# Patient Record
Sex: FEMALE | Race: BLACK OR AFRICAN AMERICAN | NOT HISPANIC OR LATINO | URBAN - METROPOLITAN AREA
[De-identification: names, ages, dates, MRNs, and addresses within clinical notes are randomized per-mention and may not be internally consistent; named-entity substitution may affect disease eponyms.]

---

## 2023-09-21 ENCOUNTER — LAB (OUTPATIENT)
Dept: LAB | Facility: LAB | Age: 38
End: 2023-09-21
Payer: COMMERCIAL

## 2023-09-21 LAB
HEPATITIS B VIRUS SURFACE AG PRESENCE IN SERUM: NONREACTIVE
HEPATITIS C VIRUS AB PRESENCE IN SERUM: NONREACTIVE
HIV 1/ 2 AG/AB SCREEN: NONREACTIVE
SYPHILIS TOTAL AB: NONREACTIVE

## 2023-09-22 LAB
CHLAMYDIA TRACH., AMPLIFIED: NEGATIVE
N. GONORRHEA, AMPLIFIED: NEGATIVE
TRICHOMONAS VAGINALIS: NEGATIVE
URINE CULTURE: NORMAL

## 2023-09-23 LAB
CLUE CELLS: ABNORMAL
NUGENT SCORE: 6
VAGINITIS-BV + YEAST INTERPRETATION: ABNORMAL
YEAST: ABNORMAL

## 2024-01-04 ENCOUNTER — OFFICE VISIT (OUTPATIENT)
Dept: PRIMARY CARE | Facility: HOSPITAL | Age: 39
End: 2024-01-04
Payer: COMMERCIAL

## 2024-01-04 VITALS
OXYGEN SATURATION: 100 % | HEART RATE: 101 BPM | BODY MASS INDEX: 50.02 KG/M2 | WEIGHT: 293 LBS | TEMPERATURE: 95.6 F | HEIGHT: 64 IN | SYSTOLIC BLOOD PRESSURE: 173 MMHG | DIASTOLIC BLOOD PRESSURE: 130 MMHG

## 2024-01-04 DIAGNOSIS — R35.0 URINARY FREQUENCY: Primary | ICD-10-CM

## 2024-01-04 DIAGNOSIS — I10 PRIMARY HYPERTENSION: ICD-10-CM

## 2024-01-04 DIAGNOSIS — R10.84 GENERALIZED ABDOMINAL PAIN: ICD-10-CM

## 2024-01-04 DIAGNOSIS — A64 STI (SEXUALLY TRANSMITTED INFECTION): ICD-10-CM

## 2024-01-04 DIAGNOSIS — K64.4 EXTERNAL HEMORRHOIDS: ICD-10-CM

## 2024-01-04 DIAGNOSIS — R73.09 OTHER ABNORMAL GLUCOSE: ICD-10-CM

## 2024-01-04 DIAGNOSIS — E66.01 CLASS 3 SEVERE OBESITY DUE TO EXCESS CALORIES WITH SERIOUS COMORBIDITY AND BODY MASS INDEX (BMI) OF 50.0 TO 59.9 IN ADULT (MULTI): ICD-10-CM

## 2024-01-04 DIAGNOSIS — R30.0 DYSURIA: ICD-10-CM

## 2024-01-04 LAB
ALBUMIN SERPL BCP-MCNC: 4.3 G/DL (ref 3.4–5)
ALP SERPL-CCNC: 75 U/L (ref 33–110)
ALT SERPL W P-5'-P-CCNC: 13 U/L (ref 7–45)
ANION GAP SERPL CALC-SCNC: 13 MMOL/L (ref 10–20)
APPEARANCE UR: CLEAR
AST SERPL W P-5'-P-CCNC: 24 U/L (ref 9–39)
BASOPHILS # BLD AUTO: 0.02 X10*3/UL (ref 0–0.1)
BASOPHILS NFR BLD AUTO: 0.7 %
BILIRUB SERPL-MCNC: 0.7 MG/DL (ref 0–1.2)
BILIRUB UR STRIP.AUTO-MCNC: NEGATIVE MG/DL
BUN SERPL-MCNC: 7 MG/DL (ref 6–23)
CALCIUM SERPL-MCNC: 9.3 MG/DL (ref 8.6–10.6)
CHLORIDE SERPL-SCNC: 99 MMOL/L (ref 98–107)
CHOLEST SERPL-MCNC: 245 MG/DL (ref 0–199)
CHOLESTEROL/HDL RATIO: 3.3
CO2 SERPL-SCNC: 27 MMOL/L (ref 21–32)
COLOR UR: YELLOW
CREAT SERPL-MCNC: 0.73 MG/DL (ref 0.5–1.05)
EOSINOPHIL # BLD AUTO: 0.02 X10*3/UL (ref 0–0.7)
EOSINOPHIL NFR BLD AUTO: 0.7 %
ERYTHROCYTE [DISTWIDTH] IN BLOOD BY AUTOMATED COUNT: 11.8 % (ref 11.5–14.5)
EST. AVERAGE GLUCOSE BLD GHB EST-MCNC: 85 MG/DL
GFR SERPL CREATININE-BSD FRML MDRD: >90 ML/MIN/1.73M*2
GLUCOSE SERPL-MCNC: 95 MG/DL (ref 74–99)
GLUCOSE UR STRIP.AUTO-MCNC: NEGATIVE MG/DL
HAV IGM SER QL: NONREACTIVE
HBA1C MFR BLD: 4.6 %
HBV CORE IGM SER QL: NONREACTIVE
HBV SURFACE AG SERPL QL IA: NONREACTIVE
HCT VFR BLD AUTO: 38.5 % (ref 36–46)
HCV AB SER QL: NONREACTIVE
HDLC SERPL-MCNC: 74.8 MG/DL
HGB BLD-MCNC: 13.7 G/DL (ref 12–16)
HIV 1+2 AB+HIV1 P24 AG SERPL QL IA: NONREACTIVE
IMM GRANULOCYTES # BLD AUTO: 0.01 X10*3/UL (ref 0–0.7)
IMM GRANULOCYTES NFR BLD AUTO: 0.4 % (ref 0–0.9)
KETONES UR STRIP.AUTO-MCNC: NEGATIVE MG/DL
LDLC SERPL CALC-MCNC: 143 MG/DL
LEUKOCYTE ESTERASE UR QL STRIP.AUTO: NEGATIVE
LIPASE SERPL-CCNC: 32 U/L (ref 9–82)
LYMPHOCYTES # BLD AUTO: 1.76 X10*3/UL (ref 1.2–4.8)
LYMPHOCYTES NFR BLD AUTO: 62.6 %
MCH RBC QN AUTO: 33 PG (ref 26–34)
MCHC RBC AUTO-ENTMCNC: 35.6 G/DL (ref 32–36)
MCV RBC AUTO: 93 FL (ref 80–100)
MONOCYTES # BLD AUTO: 0.27 X10*3/UL (ref 0.1–1)
MONOCYTES NFR BLD AUTO: 9.6 %
NEUTROPHILS # BLD AUTO: 0.73 X10*3/UL (ref 1.2–7.7)
NEUTROPHILS NFR BLD AUTO: 26 %
NITRITE UR QL STRIP.AUTO: NEGATIVE
NON HDL CHOLESTEROL: 170 MG/DL (ref 0–149)
NRBC BLD-RTO: 0 /100 WBCS (ref 0–0)
PH UR STRIP.AUTO: 6 [PH]
PLATELET # BLD AUTO: 196 X10*3/UL (ref 150–450)
POTASSIUM SERPL-SCNC: 4.1 MMOL/L (ref 3.5–5.3)
PROT SERPL-MCNC: 7.2 G/DL (ref 6.4–8.2)
PROT UR STRIP.AUTO-MCNC: NEGATIVE MG/DL
RBC # BLD AUTO: 4.15 X10*6/UL (ref 4–5.2)
RBC # UR STRIP.AUTO: NEGATIVE /UL
SODIUM SERPL-SCNC: 135 MMOL/L (ref 136–145)
SP GR UR STRIP.AUTO: 1.01
TRIGL SERPL-MCNC: 136 MG/DL (ref 0–149)
UROBILINOGEN UR STRIP.AUTO-MCNC: 2 MG/DL
VLDL: 27 MG/DL (ref 0–40)
WBC # BLD AUTO: 2.8 X10*3/UL (ref 4.4–11.3)

## 2024-01-04 PROCEDURE — 80053 COMPREHEN METABOLIC PANEL: CPT

## 2024-01-04 PROCEDURE — 36415 COLL VENOUS BLD VENIPUNCTURE: CPT | Mod: GC

## 2024-01-04 PROCEDURE — 99215 OFFICE O/P EST HI 40 MIN: CPT | Mod: GE

## 2024-01-04 PROCEDURE — 3077F SYST BP >= 140 MM HG: CPT

## 2024-01-04 PROCEDURE — 86705 HEP B CORE ANTIBODY IGM: CPT

## 2024-01-04 PROCEDURE — 83690 ASSAY OF LIPASE: CPT

## 2024-01-04 PROCEDURE — 85025 COMPLETE CBC W/AUTO DIFF WBC: CPT

## 2024-01-04 PROCEDURE — 87389 HIV-1 AG W/HIV-1&-2 AB AG IA: CPT

## 2024-01-04 PROCEDURE — 3008F BODY MASS INDEX DOCD: CPT

## 2024-01-04 PROCEDURE — 99205 OFFICE O/P NEW HI 60 MIN: CPT

## 2024-01-04 PROCEDURE — 87800 DETECT AGNT MULT DNA DIREC: CPT

## 2024-01-04 PROCEDURE — 83036 HEMOGLOBIN GLYCOSYLATED A1C: CPT

## 2024-01-04 PROCEDURE — 3080F DIAST BP >= 90 MM HG: CPT

## 2024-01-04 PROCEDURE — 36415 COLL VENOUS BLD VENIPUNCTURE: CPT

## 2024-01-04 PROCEDURE — 80061 LIPID PANEL: CPT

## 2024-01-04 PROCEDURE — 81003 URINALYSIS AUTO W/O SCOPE: CPT

## 2024-01-04 RX ORDER — AMLODIPINE BESYLATE 5 MG/1
5 TABLET ORAL DAILY
Qty: 90 TABLET | Refills: 0 | Status: SHIPPED | OUTPATIENT
Start: 2024-01-04 | End: 2024-01-18 | Stop reason: DRUGHIGH

## 2024-01-04 RX ORDER — LOSARTAN POTASSIUM 50 MG/1
50 TABLET ORAL DAILY
Qty: 30 TABLET | Refills: 2 | Status: SHIPPED | OUTPATIENT
Start: 2024-01-04 | End: 2024-04-25 | Stop reason: SDUPTHER

## 2024-01-04 RX ORDER — NEBULIZER AND COMPRESSOR
1 EACH MISCELLANEOUS DAILY
Qty: 1 EACH | Refills: 0 | Status: SHIPPED | OUTPATIENT
Start: 2024-01-04

## 2024-01-04 RX ORDER — AMLODIPINE BESYLATE 5 MG/1
5 TABLET ORAL DAILY
COMMUNITY
End: 2024-01-04 | Stop reason: SDUPTHER

## 2024-01-04 ASSESSMENT — ENCOUNTER SYMPTOMS
DEPRESSION: 1
DYSURIA: 0
APPETITE CHANGE: 0
NAUSEA: 0
FEVER: 0
COLOR CHANGE: 0
FLANK PAIN: 0
CHILLS: 0
ACTIVITY CHANGE: 0
WEAKNESS: 0
SHORTNESS OF BREATH: 0
VOMITING: 0
ABDOMINAL PAIN: 1
HEMATOLOGIC/LYMPHATIC NEGATIVE: 1
WHEEZING: 0
ALLERGIC/IMMUNOLOGIC NEGATIVE: 1
LOSS OF SENSATION IN FEET: 0
ENDOCRINE NEGATIVE: 1
JOINT SWELLING: 0
FREQUENCY: 1
MYALGIAS: 0
PALPITATIONS: 0
PHOTOPHOBIA: 0
LIGHT-HEADEDNESS: 0
BACK PAIN: 0
DIZZINESS: 0
OCCASIONAL FEELINGS OF UNSTEADINESS: 0
NUMBNESS: 0
DIARRHEA: 0
SORE THROAT: 0
FATIGUE: 0
HEADACHES: 0
PSYCHIATRIC NEGATIVE: 1
COUGH: 0

## 2024-01-04 ASSESSMENT — PATIENT HEALTH QUESTIONNAIRE - PHQ9
10. IF YOU CHECKED OFF ANY PROBLEMS, HOW DIFFICULT HAVE THESE PROBLEMS MADE IT FOR YOU TO DO YOUR WORK, TAKE CARE OF THINGS AT HOME, OR GET ALONG WITH OTHER PEOPLE: SOMEWHAT DIFFICULT
1. LITTLE INTEREST OR PLEASURE IN DOING THINGS: SEVERAL DAYS
2. FEELING DOWN, DEPRESSED OR HOPELESS: SEVERAL DAYS
SUM OF ALL RESPONSES TO PHQ9 QUESTIONS 1 AND 2: 2

## 2024-01-04 ASSESSMENT — PAIN SCALES - GENERAL: PAINLEVEL: 2

## 2024-01-04 NOTE — PATIENT INSTRUCTIONS
As discussed today, our plan is:     Labs - I will call you with the results.   Medication changes - please continue to take the amlodipine every day. We also started you on another blood pressure medication called losartan. Please take this every day. Please check your blood pressure and record the numbers every day.   I've ordered an ultrasound of your abdomen to help evaluate your abdominal pain.   We checked you for STIs today - I will call you with the results.     Please come back to see me in: 6 weeks  ------  If you have any problems or questions, please contact the clinic at [phone] to leave a message. If your issue cannot wait until the next business day, please go to urgent care or the emergency department.     I also strongly urge all of my patients to register for [facility] by going to: [email or web address]  (The  staff can also send you a text/email link to register when you check out).    No shows: It is understandable if you are unable to make it to a visit, but please cancel your appointment instead of not showing up. This helps to give other patients access to primary care and keeps wait times low.      Campbell Spring MD

## 2024-01-04 NOTE — PROGRESS NOTES
Subjective   Chief complaint: establish care    HPI:  Frankeya Edwards is a 38 y.o. female with past medical history of hypertension, bipolar disorder, and schizophrenia presenting today to [facility].  Patient receives her mental health care at [facility].    Patient complaining of right shoulder pain that radiates to her right upper quadrant that has been going on for the past 3 months.  It has not become any better or worse in this time period.  There is no association with food.  Lying on her left side relieves the pain, and lying on her right side exacerbates the pain.  She does notice some slight relief when taking ibuprofen, but she does this very rarely.  She does endorse loose stools which has been going on for several years.  Patient also endorsing some urinary frequency but denies urgency, incontinence, dysuria, or malodorous urine.  She denies fever, chills, chest pain, dyspnea, N/V.    She was prescribed amlodipine 5 mg from the emergency department several months ago but states that she only takes it on days where she has a headache or feels that her blood pressure is high.  She does not have a blood pressure cuff or monitor blood pressures at home.      Health maintenance:  Health Maintenance   Topic Date Due    Influenza Vaccine (1) 06/30/2024 (Originally 9/1/2023)       Medications:    Current Outpatient Medications:     amLODIPine (Norvasc) 5 mg tablet, Take 1 tablet (5 mg) by mouth once daily., Disp: 90 tablet, Rfl: 0    losartan (Cozaar) 50 mg tablet, Take 1 tablet (50 mg) by mouth once daily., Disp: 30 tablet, Rfl: 2    miscellaneous medical supply (Blood Pressure Cuff) misc, 1 Units once daily., Disp: 1 each, Rfl: 0    tissue resp fact-shark luis oil (Preparation H) rectal ointment, Insert into the rectum 2 times a day as needed for hemorrhoids for up to 7 days., Disp: 30 g, Rfl: 0    Allergies:  No Known Allergies    Past medical history:  Past Medical History:   Diagnosis Date     Bipolar 1 disorder (CMS/HCC)     Candidiasis, unspecified     Yeast infection    Chlamydial infection, unspecified     Chlamydia    Depression     Encounter for screening for malignant neoplasm of vagina     Vaginal Pap smear    Hypertension     Other conditions influencing health status     H/O pregnancy    Other specified health status     Last menstrual period (LMP) > 10 days ago    Personal history of other complications of pregnancy, childbirth and the puerperium     History of ectopic pregnancy    Personal history of other diseases of the circulatory system     History of hypertension    Personal history of other endocrine, nutritional and metabolic disease     History of obesity    Personal history of other mental and behavioral disorders     History of bipolar disorder    Personal history of other mental and behavioral disorders     Personal history of schizophrenia    Personal history of other specified conditions     History of abnormal Pap smear    Schizophrenia (CMS/HCC)     Type B blood, Rh positive     Blood type B+       Surgical history:  Past Surgical History:   Procedure Laterality Date     SECTION, CLASSIC  2014     Section    COLPOSCOPY  2014    Colposcopy    TUBAL LIGATION  2016    Tubal Ligation       Family history:  Family History   Problem Relation Name Age of Onset    Hypertension Mother         Social history:   reports that she has been smoking cigarettes and cigars. She started smoking about 21 years ago. She has never used smokeless tobacco. She reports current alcohol use of about 100.0 standard drinks of alcohol per week. She reports that she does not use drugs.    Review of systems:  Review of Systems   Constitutional:  Negative for activity change, appetite change, chills, fatigue and fever.   HENT:  Negative for congestion, ear pain, sneezing and sore throat.    Eyes:  Negative for photophobia and visual disturbance.   Respiratory:  Negative for  cough, shortness of breath and wheezing.    Cardiovascular:  Negative for chest pain and palpitations.   Gastrointestinal:  Positive for abdominal pain. Negative for diarrhea, nausea and vomiting.   Endocrine: Negative.    Genitourinary:  Positive for frequency. Negative for dysuria, flank pain and urgency.   Musculoskeletal:  Negative for back pain, joint swelling and myalgias.   Skin:  Negative for color change and rash.   Allergic/Immunologic: Negative.    Neurological:  Negative for dizziness, weakness, light-headedness, numbness and headaches.   Hematological: Negative.    Psychiatric/Behavioral: Negative.            Objective     Vitals:  Vitals:    01/04/24 1505   BP: (!) 173/130   Pulse: 101   Temp: 35.3 °C (95.6 °F)   SpO2: 100%       Physical exam:  Physical Exam  Constitutional:       General: She is not in acute distress.     Appearance: She is not toxic-appearing.   HENT:      Head: Normocephalic and atraumatic.      Mouth/Throat:      Mouth: Mucous membranes are moist.      Pharynx: Oropharynx is clear. No oropharyngeal exudate or posterior oropharyngeal erythema.   Eyes:      General: No scleral icterus.     Conjunctiva/sclera: Conjunctivae normal.      Pupils: Pupils are equal, round, and reactive to light.   Cardiovascular:      Rate and Rhythm: Normal rate and regular rhythm.      Heart sounds: No murmur heard.     No gallop.   Pulmonary:      Effort: Pulmonary effort is normal. No respiratory distress.      Breath sounds: Normal breath sounds. No wheezing or rales.   Abdominal:      General: There is no distension.      Tenderness: There is abdominal tenderness in the right upper quadrant. There is no right CVA tenderness, left CVA tenderness, guarding or rebound. Negative signs include Ferguson's sign.   Musculoskeletal:         General: No swelling.      Cervical back: Normal range of motion and neck supple.      Right lower leg: No edema.      Left lower leg: No edema.   Lymphadenopathy:       Cervical: No cervical adenopathy.   Skin:     General: Skin is warm and dry.   Neurological:      General: No focal deficit present.      Mental Status: She is oriented to person, place, and time.   Psychiatric:         Mood and Affect: Mood normal.         Behavior: Behavior normal.         Thought Content: Thought content normal.         Labs:  No results found for this or any previous visit (from the past 24 hour(s)).    Imaging:  No results found.       Assessment/Plan     Assessment and plan:  Frankeya Edwards is a 38 y.o. female with past medical history of hypertension, bipolar disorder, and schizophrenia presenting today to [facility] care.  Patient receives her mental health care at [facility].      #abdominal pain   - abdominal US   - CMP, lipase, lipid panel, CBC       #urinary frequency   - UA with reflex ordered  - STI testing ordered      #HTN  - BP in office 173/130  - takes amlodipine 5mg intermittently   - refilled amlodipine 5mg, educated pt on med compliance  -  started losartan 50mg daily   - BP cuff ordered, instructed pt to check BP daily and record it to bring to next visit   - nurse visit in 2 weeks to check BP      #bipolar disorder   #schizophrenia   - follows at [facility]   - pt unsure which meds she is on   - pt asked to bring meds to next appt    #polysusbtance use disorder   - alcohol and tobacco   - pt reports drinking 4-5 fifths of alcohol per week   - interested in rehab - will pursue at [facility] per pt        #Health Maintenance   Screening:  Lung Cancer: not indicated   Colon Cancer: not indicated   Breast Cancer: not indicated   Cervical Cancer: last PAP 9/2023    Labs:  Lipid Panel: ordered today  HbA1c: ordered today    Infectious Disease   HIV: ordered today  Hep C: ordered today  STIs: ordered today    Vaccinations:  Influenza: refused        Follow-up in 6 weeks.    Patient and plan discussed with attending physician Dr. Quijano.    Campbell Spring MD  Internal  Medicine, PGY-2  [facility]

## 2024-01-05 LAB
C TRACH RRNA SPEC QL NAA+PROBE: NEGATIVE
N GONORRHOEA DNA SPEC QL PROBE+SIG AMP: NEGATIVE

## 2024-01-05 NOTE — PROGRESS NOTES
I discussed this patient with the resident, and I agree with the resident/fellow's medical decision making as documented in the note.    Mariano Quijano MD

## 2024-01-08 ENCOUNTER — TELEPHONE (OUTPATIENT)
Dept: PRIMARY CARE | Facility: HOSPITAL | Age: 39
End: 2024-01-08
Payer: COMMERCIAL

## 2024-01-08 NOTE — TELEPHONE ENCOUNTER
Result Communication    Called patient to review lab work.  Discussed leukopenia, specifically the neutropenia.  Suspect this is a result of her chronic alcohol use vs less likely medication induced (pt on aripiprazole and paroxetine which she has been on for many years per pt report).  Advised patient the need to decrease alcohol consumption.  Will repeat CBC in 6 months to ensure leukopenia has not become worse.  Also discussed elevated cholesterol.  Patient advised on lifestyle modifications that can be beneficial in reducing cholesterol.  Patient expressed understanding.  Patient has upcoming radiology appointment to have abdominal ultrasound completed on 1/12/2024 and a nurse visit on 1/18/2024 to check blood pressure.  Will see patient back in clinic in about 6 weeks.  Patient expressed understanding and all questions were answered.      Resulted Orders   Urinalysis with Reflex Microscopic   Result Value Ref Range    Color, Urine Yellow Straw, Yellow    Appearance, Urine Clear Clear    Specific Gravity, Urine 1.013 1.005 - 1.035    pH, Urine 6.0 5.0, 5.5, 6.0, 6.5, 7.0, 7.5, 8.0    Protein, Urine NEGATIVE NEGATIVE mg/dL    Glucose, Urine NEGATIVE NEGATIVE mg/dL    Blood, Urine NEGATIVE NEGATIVE    Ketones, Urine NEGATIVE NEGATIVE mg/dL    Bilirubin, Urine NEGATIVE NEGATIVE    Urobilinogen, Urine 2.0 (N) <2.0 mg/dL      Comment:      Due to a manufacturing issue, low positive urobilinogen results may be falsely positive. Correlate with urine bilirubin and additional clinical/laboratory findings to assess the risk of hemolytic anemia or liver disease. If clinically indicated, repeat testing with an alternate method is available by contacting the laboratory within 24 hours.    Some pigments and medications may cause a false positive urobilinogen.    Nitrite, Urine NEGATIVE NEGATIVE    Leukocyte Esterase, Urine NEGATIVE NEGATIVE   CBC and Auto Differential   Result Value Ref Range    WBC 2.8 (L) 4.4 - 11.3  x10*3/uL    nRBC 0.0 0.0 - 0.0 /100 WBCs    RBC 4.15 4.00 - 5.20 x10*6/uL    Hemoglobin 13.7 12.0 - 16.0 g/dL    Hematocrit 38.5 36.0 - 46.0 %    MCV 93 80 - 100 fL    MCH 33.0 26.0 - 34.0 pg    MCHC 35.6 32.0 - 36.0 g/dL    RDW 11.8 11.5 - 14.5 %    Platelets 196 150 - 450 x10*3/uL    Neutrophils % 26.0 40.0 - 80.0 %    Immature Granulocytes %, Automated 0.4 0.0 - 0.9 %      Comment:      Immature Granulocyte Count (IG) includes promyelocytes, myelocytes and metamyelocytes but does not include bands. Percent differential counts (%) should be interpreted in the context of the absolute cell counts (cells/UL).    Lymphocytes % 62.6 13.0 - 44.0 %    Monocytes % 9.6 2.0 - 10.0 %    Eosinophils % 0.7 0.0 - 6.0 %    Basophils % 0.7 0.0 - 2.0 %    Neutrophils Absolute 0.73 (L) 1.20 - 7.70 x10*3/uL      Comment:      Percent differential counts (%) should be interpreted in the context of the absolute cell counts (cells/uL).    Immature Granulocytes Absolute, Automated 0.01 0.00 - 0.70 x10*3/uL    Lymphocytes Absolute 1.76 1.20 - 4.80 x10*3/uL    Monocytes Absolute 0.27 0.10 - 1.00 x10*3/uL    Eosinophils Absolute 0.02 0.00 - 0.70 x10*3/uL    Basophils Absolute 0.02 0.00 - 0.10 x10*3/uL   Comprehensive metabolic panel   Result Value Ref Range    Glucose 95 74 - 99 mg/dL    Sodium 135 (L) 136 - 145 mmol/L    Potassium 4.1 3.5 - 5.3 mmol/L    Chloride 99 98 - 107 mmol/L    Bicarbonate 27 21 - 32 mmol/L    Anion Gap 13 10 - 20 mmol/L    Urea Nitrogen 7 6 - 23 mg/dL    Creatinine 0.73 0.50 - 1.05 mg/dL    eGFR >90 >60 mL/min/1.73m*2      Comment:      Calculations of estimated GFR are performed using the 2021 CKD-EPI Study Refit equation without the race variable for the IDMS-Traceable creatinine methods.  https://jasn.asnjournals.org/content/early/2021/09/22/ASN.1345794498    Calcium 9.3 8.6 - 10.6 mg/dL    Albumin 4.3 3.4 - 5.0 g/dL    Alkaline Phosphatase 75 33 - 110 U/L    Total Protein 7.2 6.4 - 8.2 g/dL    AST 24 9 - 39  U/L    Bilirubin, Total 0.7 0.0 - 1.2 mg/dL    ALT 13 7 - 45 U/L      Comment:      Patients treated with Sulfasalazine may generate falsely decreased results for ALT.   C. trachomatis + N. gonorrhoeae, Amplified   Result Value Ref Range    Neisseria gonorrhea,Amplified Negative Negative    Chlamydia trachomatis, Amplified Negative Negative    Narrative    The APTIMA Combo 2 assay is FDA-approved NAAT using target capture for the in vitro qualitative detection and differentiation of ribosomal RNA (rRNA) for Chlamydia trachomatis and Neisseria gonorrhoeae testing on clinician-collected endocervical, PreservCyt solution liquid Pap specimens, vaginal, throat, rectal, and male urethral swab specimens; patient-collected vaginal swab specimens, and female and male urine specimens from symptomatic and asymptomatic individuals. Samples from all other sites are not validated for this method.   HIV 1/2 Antigen/Antibody Screen with Reflex to Confirmation   Result Value Ref Range    HIV 1/2 Antigen/Antibody Screen with Reflex to Confirmation Nonreactive Nonreactive    Narrative    HIV Ag/Ab screen is performed using the Siemens Atellica HIV Ag/Ab Combo assay which detects the presence of HIV p24 antigen as well as antibodies to HIV-1 (Group M and O) and HIV-2.  No laboratory evidence of HIV infection. If acute HIV infection is suspected, consider testing for HIV RNA by PCR (viral load).   Hepatitis Panel, Acute   Result Value Ref Range    Hepatitis B Surface AG Nonreactive Nonreactive      Comment:      Biotin interference may cause falsely decreased results. Patients taking a Biotin dose of up to 5 mg/day should refrain from taking Biotin for 24 hours before sample collection. Providers may contact their local laboratory for  further information.    Hepatitis A  AB- IgM Nonreactive Nonreactive      Comment:      Biotin interference may cause falsely decreased results. Patients taking a Biotin dose of up to 5 mg/day should  refrain from taking Biotin for 24 hours before sample collection. Providers may contact their local laboratory  for further information.        Hepatitis B Core AB; IgM Nonreactive Nonreactive      Comment:      Results from patients taking biotin supplements or receiving high-dose biotin therapy should be interpreted with caution due to possible interference with this test. Providers may contact their local laboratory for further information.        Hepatitis C AB Nonreactive Nonreactive      Comment:      Results from patients taking biotin supplements or receiving high-dose biotin therapy should be interpreted with caution due to possible interference with this test. Providers may contact their local laboratory for further information.   Lipase   Result Value Ref Range    Lipase 32 9 - 82 U/L    Narrative    Venipuncture immediately after or during the administration of Metamizole may lead to falsely low results. Testing should be performed immediately prior to Metamizole dosing.   Lipid panel   Result Value Ref Range    Cholesterol 245 (H) 0 - 199 mg/dL      Comment:            Age      Desirable   Borderline High   High     0-19 Y     0 - 169       170 - 199     >/= 200    20-24 Y     0 - 189       190 - 224     >/= 225         >24 Y     0 - 199       200 - 239     >/= 240   **All ranges are based on fasting samples. Specific   therapeutic targets will vary based on patient-specific   cardiac risk.    Pediatric guidelines reference:Pediatrics 2011, 128(S5).Adult guidelines reference: NCEP ATPIII Guidelines,GENEVA 2001, 258:2486-97    Venipuncture immediately after or during the administration of Metamizole may lead to falsely low results. Testing should be performed immediately prior to Metamizole dosing.    HDL-Cholesterol 74.8 mg/dL      Comment:        Age       Very Low   Low     Normal    High    0-19 Y    < 35      < 40     40-45     ----  20-24 Y    ----     < 40      >45      ----        >24 Y      ----      < 40     40-60      >60      Cholesterol/HDL Ratio 3.3       Comment:        Ref Values  Desirable  < 3.4  High Risk  > 5.0    LDL Calculated 143 (H) <=99 mg/dL      Comment:                                  Near   Borderline      AGE      Desirable  Optimal    High     High     Very High     0-19 Y     0 - 109     ---    110-129   >/= 130     ----    20-24 Y     0 - 119     ---    120-159   >/= 160     ----      >24 Y     0 -  99   100-129  130-159   160-189     >/=190      VLDL 27 0 - 40 mg/dL    Triglycerides 136 0 - 149 mg/dL      Comment:         Age         Desirable   Borderline High   High     Very High   0 D-90 D    19 - 174         ----         ----        ----  91 D- 9 Y     0 -  74        75 -  99     >/= 100      ----    10-19 Y     0 -  89        90 - 129     >/= 130      ----    20-24 Y     0 - 114       115 - 149     >/= 150      ----         >24 Y     0 - 149       150 - 199    200- 499    >/= 500    Venipuncture immediately after or during the administration of Metamizole may lead to falsely low results. Testing should be performed immediately prior to Metamizole dosing.    Non HDL Cholesterol 170 (H) 0 - 149 mg/dL      Comment:            Age       Desirable   Borderline High   High     Very High     0-19 Y     0 - 119       120 - 144     >/= 145    >/= 160    20-24 Y     0 - 149       150 - 189     >/= 190      ----         >24 Y    30 mg/dL above LDL Cholesterol goal     Hemoglobin A1c   Result Value Ref Range    Hemoglobin A1C 4.6 see below %    Estimated Average Glucose 85 Not Established mg/dL    Narrative    Diagnosis of Diabetes-Adults  Non-Diabetic: < or = 5.6%  Increased risk for developing diabetes: 5.7-6.4%  Diagnostic of diabetes: > or = 6.5%    Monitoring of Diabetes  Age (y)....................... Therapeutic Goal (%)  Adults: >18.........................<7.0  Pediatrics: 13-18...................<7.5  Pediatrics: 7-12....................<8.0  Pediatrics: 0-6.....................  7.5-8.5    American Diabetes Association. Diabetes Care 33(S1), Jan 2010           1:26 PM      Results were successfully communicated with the patient and they acknowledged their understanding.

## 2024-01-18 ENCOUNTER — CLINICAL SUPPORT (OUTPATIENT)
Dept: PRIMARY CARE | Facility: HOSPITAL | Age: 39
End: 2024-01-18
Payer: COMMERCIAL

## 2024-01-18 VITALS — HEART RATE: 77 BPM | SYSTOLIC BLOOD PRESSURE: 155 MMHG | DIASTOLIC BLOOD PRESSURE: 108 MMHG

## 2024-01-18 DIAGNOSIS — I10 HYPERTENSION, UNSPECIFIED TYPE: ICD-10-CM

## 2024-01-18 PROCEDURE — 99211 OFF/OP EST MAY X REQ PHY/QHP: CPT | Performed by: INTERNAL MEDICINE

## 2024-01-18 RX ORDER — AMLODIPINE BESYLATE 10 MG/1
10 TABLET ORAL DAILY
Qty: 30 TABLET | Refills: 5 | Status: SHIPPED | OUTPATIENT
Start: 2024-01-18 | End: 2024-07-16

## 2024-01-18 RX ORDER — AMLODIPINE BESYLATE 10 MG/1
10 TABLET ORAL DAILY
Qty: 30 TABLET | Refills: 5 | Status: CANCELLED | OUTPATIENT
Start: 2024-01-18 | End: 2024-07-16

## 2024-01-18 NOTE — PROGRESS NOTES
Frankeya Edwards reports [facility] as an established patient on the nurse's schedule for blood pressure check after an elevated bp reading at the 01/04/2024 office visit of 173/130 with a heart rate of 101. A & O x 4, ambulates independently without assistance. Identifiers x 2, in no acute distress. No fever, cough, flu or covid symptoms reported. Allergies reviewed, reports no adverse reactions or side effects to the current treatment plan of Amlodipine 5 mg and Losartan 50 mg daily. Today's blood pressure reading is 155/108 with a heart rate of 77. Results discussed with Dr. Saucedo, recommendations are to the amlodipine 5 mg to 10 mg daily and continue the losartan 50 mg daily.  Patient to keep follow up with PCP next month, Rx for amlodipine 10 mg sent to pharmacy, patient discharged in stable condition.

## 2024-01-22 ENCOUNTER — HOSPITAL ENCOUNTER (OUTPATIENT)
Dept: RADIOLOGY | Facility: HOSPITAL | Age: 39
Discharge: HOME | End: 2024-01-22
Payer: COMMERCIAL

## 2024-01-22 DIAGNOSIS — R10.84 GENERALIZED ABDOMINAL PAIN: ICD-10-CM

## 2024-01-22 PROCEDURE — 76700 US EXAM ABDOM COMPLETE: CPT

## 2024-01-22 PROCEDURE — 76705 ECHO EXAM OF ABDOMEN: CPT | Performed by: RADIOLOGY

## 2024-02-26 PROBLEM — O99.210 OBESITY AFFECTING PREGNANCY (HHS-HCC): Status: ACTIVE | Noted: 2024-02-26

## 2024-02-26 PROBLEM — T14.8XXA SURGICAL WOUND PRESENT: Status: ACTIVE | Noted: 2024-02-26

## 2024-02-26 PROBLEM — O35.BXX0 ABNORMAL FETAL ECHOCARDIOGRAM AFFECTING ANTEPARTUM CARE OF MOTHER (HHS-HCC): Status: ACTIVE | Noted: 2024-02-26

## 2024-02-26 PROBLEM — O10.019: Status: ACTIVE | Noted: 2024-02-26

## 2024-02-26 PROBLEM — F10.929 ALCOHOLIC INTOXICATION (CMS-HCC): Status: ACTIVE | Noted: 2024-02-26

## 2024-02-26 PROBLEM — Z34.80: Status: ACTIVE | Noted: 2024-02-26

## 2024-02-26 PROBLEM — E66.01 OBESITY, CLASS III, BMI 40-49.9 (MORBID OBESITY) (MULTI): Status: ACTIVE | Noted: 2020-07-01

## 2024-02-26 PROBLEM — T81.49XA POSTOPERATIVE WOUND INFECTION: Status: ACTIVE | Noted: 2024-02-26

## 2024-02-26 PROBLEM — R30.0 DYSURIA: Status: ACTIVE | Noted: 2024-02-26

## 2024-02-26 PROBLEM — Z86.79 HISTORY OF HYPERTENSION: Status: ACTIVE | Noted: 2024-02-26

## 2024-02-26 PROBLEM — O34.219 HISTORY OF CESAREAN DELIVERY, CURRENTLY PREGNANT (HHS-HCC): Status: ACTIVE | Noted: 2024-02-26

## 2024-02-26 PROBLEM — N92.6 MENSTRUAL BLEEDING PROBLEM: Status: ACTIVE | Noted: 2024-02-26

## 2024-02-26 PROBLEM — B49 INFECTION DUE TO FUNGUS: Status: ACTIVE | Noted: 2024-02-26

## 2024-02-26 PROBLEM — O28.3 ABNORMAL PRENATAL ULTRASOUND: Status: ACTIVE | Noted: 2024-02-26

## 2024-02-26 PROBLEM — M54.50 LOW BACK PAIN, UNSPECIFIED: Status: ACTIVE | Noted: 2023-08-31

## 2024-02-26 PROBLEM — N85.9 OBSTETRIC DISORDER OF UTERUS (HHS-HCC): Status: ACTIVE | Noted: 2024-02-26

## 2024-02-26 PROBLEM — R10.9 ABDOMINAL PAIN: Status: ACTIVE | Noted: 2023-09-21

## 2024-02-26 PROBLEM — N76.0 ACUTE VAGINITIS: Status: ACTIVE | Noted: 2023-09-21

## 2024-02-26 PROBLEM — S39.012A LUMBOSACRAL STRAIN: Status: ACTIVE | Noted: 2023-08-31

## 2024-02-26 PROBLEM — T14.90XA TRAUMA: Status: ACTIVE | Noted: 2024-02-26

## 2024-02-26 PROBLEM — G89.18 POSTOPERATIVE PAIN: Status: ACTIVE | Noted: 2024-02-26

## 2024-02-26 PROBLEM — I10 PRIMARY HYPERTENSION: Status: ACTIVE | Noted: 2024-02-26

## 2024-02-26 PROBLEM — M25.552 PAIN IN LEFT HIP: Status: ACTIVE | Noted: 2023-08-31
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
## 2024-02-26 PROBLEM — O10.919 CHRONIC HYPERTENSION IN PREGNANCY (HHS-HCC): Status: ACTIVE | Noted: 2024-02-26

## 2024-02-26 PROBLEM — O35.EXX0 PYELECTASIS OF FETUS ON PRENATAL ULTRASOUND (HHS-HCC): Status: ACTIVE | Noted: 2024-02-26

## 2024-02-26 PROBLEM — R03.0 ELEVATED BLOOD PRESSURE READING: Status: ACTIVE | Noted: 2024-02-26

## 2024-02-26 PROBLEM — Z86.39 HISTORY OF OBESITY: Status: ACTIVE | Noted: 2024-02-26

## 2024-02-26 PROBLEM — Z86.59 HISTORY OF MANIC DEPRESSIVE DISORDER: Status: ACTIVE | Noted: 2024-02-26
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
## 2024-02-26 PROBLEM — A64 SEXUALLY TRANSMITTED DISEASE: Status: ACTIVE | Noted: 2024-02-26

## 2024-02-26 PROBLEM — Z78.9 OTHER SPECIFIED HEALTH STATUS: Status: ACTIVE | Noted: 2024-02-26

## 2024-02-26 PROBLEM — N61.1 BREAST ABSCESS: Status: ACTIVE | Noted: 2024-02-26

## 2024-02-26 PROBLEM — Z30.9 CONTRACEPTION MANAGEMENT: Status: ACTIVE | Noted: 2024-02-26

## 2024-02-26 PROBLEM — K64.4 EXTERNAL HEMORRHOIDS: Status: ACTIVE | Noted: 2024-02-26
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
## 2024-02-26 PROBLEM — R73.09 ABNORMAL GLUCOSE LEVEL: Status: ACTIVE | Noted: 2024-02-26

## 2024-02-26 PROBLEM — O99.891 OBSTETRIC DISORDER OF UTERUS (HHS-HCC): Status: ACTIVE | Noted: 2024-02-26

## 2024-02-26 PROBLEM — T81.49XA WOUND INFECTION AFTER SURGERY: Status: ACTIVE | Noted: 2024-02-26

## 2024-02-26 PROBLEM — R44.0 CONTINUOUS AUDITORY HALLUCINATIONS: Status: ACTIVE | Noted: 2020-07-01

## 2024-02-26 PROBLEM — F10.20 ALCOHOL USE DISORDER, SEVERE, DEPENDENCE (MULTI): Status: ACTIVE | Noted: 2020-06-30

## 2024-02-26 PROBLEM — Z72.89 OTHER PROBLEMS RELATED TO LIFESTYLE: Status: ACTIVE | Noted: 2024-02-26

## 2024-02-26 PROBLEM — M54.9 BACK PAIN: Status: ACTIVE | Noted: 2024-02-26

## 2024-02-26 PROBLEM — F17.200 NICOTINE DEPENDENCE, UNSPECIFIED, UNCOMPLICATED: Status: ACTIVE | Noted: 2023-08-31

## 2024-02-26 PROBLEM — F17.200 NICOTINE USE DISORDER: Status: ACTIVE | Noted: 2020-07-01

## 2024-02-26 PROBLEM — M25.551 PAIN IN RIGHT HIP: Status: ACTIVE | Noted: 2023-08-31

## 2024-02-26 PROBLEM — A74.9 INFECTION DUE TO CHLAMYDIA SPECIES: Status: ACTIVE | Noted: 2024-02-26

## 2024-02-26 PROBLEM — R03.0 FINDING OF ABOVE NORMAL BLOOD PRESSURE: Status: ACTIVE | Noted: 2023-08-31

## 2024-02-26 PROBLEM — E66.01 SEVERE OBESITY (MULTI): Status: ACTIVE | Noted: 2024-02-26

## 2024-02-26 PROBLEM — Z78.9 USES CONTRACEPTION: Status: ACTIVE | Noted: 2024-02-26

## 2024-02-26 PROBLEM — G89.18 POST-OP PAIN: Status: ACTIVE | Noted: 2024-02-26

## 2024-02-26 PROBLEM — N61.1 ABSCESS OF BREAST: Status: ACTIVE | Noted: 2024-02-26

## 2024-02-26 PROBLEM — O99.210 OBESITY AFFECTING PREGNANCY, ANTEPARTUM (HHS-HCC): Status: ACTIVE | Noted: 2024-02-26

## 2024-02-26 RX ORDER — MINERAL OIL, PETROLATUM, PHENYLEPHRINE HCL 2.5; 140; 749 MG/G; MG/G; MG/G
OINTMENT TOPICAL
COMMUNITY
Start: 2024-01-05 | End: 2024-05-17 | Stop reason: SINTOL

## 2024-02-26 RX ORDER — PAROXETINE 30 MG/1
30 TABLET, FILM COATED ORAL DAILY
COMMUNITY

## 2024-02-26 RX ORDER — ARIPIPRAZOLE 2 MG/1
TABLET ORAL
COMMUNITY
Start: 2023-08-04

## 2024-02-26 RX ORDER — IBUPROFEN 600 MG/1
600 TABLET ORAL EVERY 8 HOURS PRN
COMMUNITY

## 2024-02-26 RX ORDER — PRENATAL VIT NO.126/IRON/FOLIC 28MG-0.8MG
1 TABLET ORAL DAILY
COMMUNITY
Start: 2016-05-26

## 2024-02-26 RX ORDER — LANOLIN ALCOHOL/MO/W.PET/CERES
100 CREAM (GRAM) TOPICAL 3 TIMES DAILY
COMMUNITY
Start: 2020-07-04

## 2024-02-26 RX ORDER — DOCUSATE SODIUM 100 MG/1
CAPSULE, LIQUID FILLED ORAL 2 TIMES DAILY PRN
COMMUNITY
Start: 2013-10-04

## 2024-02-26 RX ORDER — PAROXETINE HYDROCHLORIDE 20 MG/1
1 TABLET, FILM COATED ORAL NIGHTLY
COMMUNITY
Start: 2020-07-04 | End: 2024-05-17 | Stop reason: SDUPTHER

## 2024-02-26 RX ORDER — ARIPIPRAZOLE 5 MG/1
5 TABLET ORAL DAILY
COMMUNITY
Start: 2024-02-05

## 2024-02-26 RX ORDER — NIFEDIPINE 30 MG/1
TABLET, FILM COATED, EXTENDED RELEASE ORAL
COMMUNITY
Start: 2016-09-01 | End: 2024-05-17 | Stop reason: WASHOUT

## 2024-02-26 RX ORDER — ARIPIPRAZOLE 15 MG/1
1 TABLET ORAL
COMMUNITY
Start: 2020-07-04

## 2024-02-26 RX ORDER — DIVALPROEX SODIUM 500 MG/1
500 TABLET, DELAYED RELEASE ORAL 2 TIMES DAILY
COMMUNITY
Start: 2024-02-05

## 2024-02-26 RX ORDER — OXYCODONE AND ACETAMINOPHEN 5; 325 MG/1; MG/1
TABLET ORAL EVERY 6 HOURS PRN
COMMUNITY
Start: 2013-10-04

## 2024-02-27 ENCOUNTER — APPOINTMENT (OUTPATIENT)
Dept: PRIMARY CARE | Facility: HOSPITAL | Age: 39
End: 2024-02-27
Payer: COMMERCIAL

## 2024-03-04 ENCOUNTER — TELEPHONE (OUTPATIENT)
Dept: PRIMARY CARE | Facility: HOSPITAL | Age: 39
End: 2024-03-04
Payer: COMMERCIAL

## 2024-03-04 NOTE — TELEPHONE ENCOUNTER
Called the patient to review the abdominal US. Pt states that her abdominal pain has subsided since  her office visit in 1/2024. Discussed that the hepatomegaly and hepatic steatosis is likely from her chronic alcohol use and obesity/diet. Pt states that she is still drinking heavily but is planning to go to inpatient rehab at [facility] in 4/2024. She follows closely with [facility] for her mental health care and they will also be managing her rehab. All questions and concerns were addressed.     Result Communication    Resulted Orders   US abdomen    Narrative    Interpreted By:  Danny Quiñones and Tippareddy Charit   STUDY:  US ABDOMEN;  1/22/2024 2:13 pm      INDICATION:  Signs/Symptoms:RUQ pain, h/o alcohol use.      COMPARISON:  None.      ACCESSION NUMBER(S):  [id number]      ORDERING CLINICIAN:  LOUISA NEVAREZ      TECHNIQUE:  Multiple images of the right upper quadrant were obtained.  Gray  scale, color Doppler and spectral Doppler waveform analysis was  performed.   This examination was interpreted at [facility].      FINDINGS:  LIMITATIONS:  Limited by patient body habitus.      LIVER:  The liver measures 17.2 cm and is mildly diffusely echogenic in  appearance, consistent with diffuse fatty infiltration. The resulting  increased beam attenuation thereby limiting evaluation of the liver  for focal lesions. Within the limitations, no focal lesions are seen.      GALLBLADDER:  The gallbladder is nondistended, and demonstrates no evidence of  gallstones, wall thickening or surrounding fluid. Sonographic  Ferguson's sign is negative.      BILIARY SYSTEM:  No evidence of intra or extrahepatic biliary dilatation is  identified; the common bile duct measures 2 mm.      PANCREAS:  The visualized pancreas is unremarkable in appearance.      RIGHT KIDNEY:  The right kidney measures 11.4 cm in length. No hydronephrosis or  renal calculi are  seen.      PERITONEUM AND RETROPERITONEUM:  There is no free or loculated fluid seen in the abdomen.        Impression    Mild hepatomegaly with hepatic steatosis.      I personally reviewed the images/study and I agree with the findings  as stated by Nila Benton MD. This study was interpreted at  [facility], [place], [place].      MACRO:  None      Signed by: Danny Irvin 1/23/2024 1:54 AM  Dictation workstation:   [id number]       12:57 PM      Results were successfully communicated with the patient and they acknowledged their understanding.

## 2024-04-22 ENCOUNTER — APPOINTMENT (OUTPATIENT)
Dept: PRIMARY CARE | Facility: HOSPITAL | Age: 39
End: 2024-04-22
Payer: COMMERCIAL

## 2024-04-25 ENCOUNTER — TELEPHONE (OUTPATIENT)
Dept: PRIMARY CARE | Facility: HOSPITAL | Age: 39
End: 2024-04-25
Payer: COMMERCIAL

## 2024-04-25 DIAGNOSIS — I10 PRIMARY HYPERTENSION: ICD-10-CM

## 2024-04-25 RX ORDER — LOSARTAN POTASSIUM 50 MG/1
50 TABLET ORAL DAILY
Qty: 30 TABLET | Refills: 2 | Status: SHIPPED | OUTPATIENT
Start: 2024-04-25 | End: 2024-07-24

## 2024-04-25 NOTE — PROGRESS NOTES
Patient called for refill request on losartan 50 mg. Confirmed with last progress note she is supposed to continue taking this medication.  Refill sent to her pharmacy. When talking to patient she said she was supposed to have an appointment 4/22/24 but was unable to attend. She tried to call to reschedule but was unsuccessful. Will schedule her an appointment for as soon as possible per patient request.

## 2024-05-17 ENCOUNTER — OFFICE VISIT (OUTPATIENT)
Dept: PRIMARY CARE | Facility: HOSPITAL | Age: 39
End: 2024-05-17
Payer: COMMERCIAL

## 2024-05-17 VITALS
WEIGHT: 293 LBS | HEART RATE: 90 BPM | BODY MASS INDEX: 50.02 KG/M2 | SYSTOLIC BLOOD PRESSURE: 151 MMHG | HEIGHT: 64 IN | TEMPERATURE: 96.3 F | OXYGEN SATURATION: 99 % | DIASTOLIC BLOOD PRESSURE: 97 MMHG

## 2024-05-17 DIAGNOSIS — I10 HYPERTENSION, UNSPECIFIED TYPE: Primary | ICD-10-CM

## 2024-05-17 DIAGNOSIS — F10.20 ALCOHOL USE DISORDER, SEVERE, DEPENDENCE (MULTI): ICD-10-CM

## 2024-05-17 DIAGNOSIS — F17.200 TOBACCO USE DISORDER: ICD-10-CM

## 2024-05-17 DIAGNOSIS — K64.4 EXTERNAL HEMORRHOIDS: ICD-10-CM

## 2024-05-17 PROCEDURE — 3080F DIAST BP >= 90 MM HG: CPT

## 2024-05-17 PROCEDURE — 99214 OFFICE O/P EST MOD 30 MIN: CPT | Mod: GC

## 2024-05-17 PROCEDURE — 99214 OFFICE O/P EST MOD 30 MIN: CPT

## 2024-05-17 PROCEDURE — 3008F BODY MASS INDEX DOCD: CPT

## 2024-05-17 PROCEDURE — 3077F SYST BP >= 140 MM HG: CPT

## 2024-05-17 RX ORDER — DIPHENHYDRAMINE HCL 25 MG
CAPSULE ORAL AS NEEDED
Qty: 100 EACH | Refills: 11 | Status: SHIPPED | OUTPATIENT
Start: 2024-05-17

## 2024-05-17 ASSESSMENT — COLUMBIA-SUICIDE SEVERITY RATING SCALE - C-SSRS
6. HAVE YOU EVER DONE ANYTHING, STARTED TO DO ANYTHING, OR PREPARED TO DO ANYTHING TO END YOUR LIFE?: NO
1. IN THE PAST MONTH, HAVE YOU WISHED YOU WERE DEAD OR WISHED YOU COULD GO TO SLEEP AND NOT WAKE UP?: NO
2. HAVE YOU ACTUALLY HAD ANY THOUGHTS OF KILLING YOURSELF?: NO

## 2024-05-17 ASSESSMENT — PATIENT HEALTH QUESTIONNAIRE - PHQ9
1. LITTLE INTEREST OR PLEASURE IN DOING THINGS: NOT AT ALL
SUM OF ALL RESPONSES TO PHQ9 QUESTIONS 1 AND 2: 0
2. FEELING DOWN, DEPRESSED OR HOPELESS: NOT AT ALL

## 2024-05-17 ASSESSMENT — ENCOUNTER SYMPTOMS
OCCASIONAL FEELINGS OF UNSTEADINESS: 0
DEPRESSION: 1
LOSS OF SENSATION IN FEET: 0

## 2024-05-17 ASSESSMENT — PAIN SCALES - GENERAL: PAINLEVEL: 0-NO PAIN

## 2024-05-17 NOTE — PATIENT INSTRUCTIONS
It was a pleasure to see you in clinic today! We discussed the following topics:  Stopping drinking: we agree with your plan to go to inpatient rehab and wish you success!  Blood pressure: your blood pressure is still high. Please focus on cutting out salt and quitting drinking. Those will help your blood pressure. We may want to increase your blood pressure medications in the future.  Hemorrhoids: prescribed hemorrhoid pads    Please follow-up with an office visit with us in 3-4 months.     is now using [employer], an online health record portal with your visit notes, results, and the ability to portal message your physicians. Ask about getting access at the check-out desk!    To schedule a specialty appointment or test, please call [phone].    If you have any questions or concerns, please contact the [facility] at [phone].    Sincerely,    Makayla Stewart MD  PGY-1 Internal Medicine  [facility]  [facility]  Phone: [phone]  Fax: [phone]

## 2024-05-17 NOTE — PROGRESS NOTES
[facility]    HPI:  Frankeya Edwards is a 38 y.o. female with past medical history of hypertension, tobacco and alcohol use, bipolar disorder, and schizophrenia presenting today for follow up. She was last seen in [facility] by [place]. Spring on 1/4/2024. She saw Renee Freed RN on 1/18 for a nurse visit for HTN and at that time her amlodipine was increased to 10 mg daily and losartan 50 mg daily continued.    She has been attempting to cut down on her drinking but then experiences tremors/diaphoresis which subsequently results in binging behaviors. She is awaiting a bed to open at inpatient [facility] rehab.    She is requesting switch from hemorrhoid ointment to pads but otherwise has no complaints today.    Medications:    Current Outpatient Medications:     amLODIPine (Norvasc) 10 mg tablet, Take 1 tablet (10 mg) by mouth once daily., Disp: 30 tablet, Rfl: 5    ARIPiprazole (Abilify) 15 mg tablet, Take 1 tablet (15 mg) by mouth once daily in the morning. Take before meals., Disp: , Rfl:     ARIPiprazole (Abilify) 2 mg tablet, TAKE ONE TABLET ONCE DAY, Disp: , Rfl:     ARIPiprazole (Abilify) 5 mg tablet, Take 1 tablet (5 mg) by mouth once daily., Disp: , Rfl:     divalproex (Depakote) 500 mg EC tablet, Take 1 tablet (500 mg) by mouth 2 times a day., Disp: , Rfl:     docusate sodium (Colace) 100 mg capsule, Take by mouth 2 times a day as needed., Disp: , Rfl:     ibuprofen 600 mg tablet, Take 1 tablet (600 mg) by mouth every 8 hours if needed., Disp: , Rfl:     losartan (Cozaar) 50 mg tablet, Take 1 tablet (50 mg) by mouth once daily., Disp: 30 tablet, Rfl: 2    miscellaneous medical supply (Blood Pressure Cuff) misc, 1 Units once daily., Disp: 1 each, Rfl: 0    multivitamin with minerals (multivitamin-iron-folic acid) tablet, Take 1 tablet by mouth once daily with breakfast., Disp: , Rfl:     NIFEdipine ER (NIFEdipine CC) 30 mg 24 hr tablet, Take by mouth., Disp: , Rfl:     oxyCODONE-acetaminophen  (Percocet) 5-325 mg tablet, Take by mouth every 6 hours if needed., Disp: , Rfl:     PARoxetine (Paxil) 20 mg tablet, Take 1 tablet (20 mg) by mouth once daily at bedtime., Disp: , Rfl:     PARoxetine (Paxil) 30 mg tablet, Take 1 tablet (30 mg) by mouth once daily., Disp: , Rfl:     prenatal 25-iron-folate 6-dha 30 mg iron-1mg -200 mg capsule, Vitamed Prenatal Formula ; 1 tab(s) once a day Quantity: 30 Refills: 6 Ordered: 4-Oct-2013 Lourdes N[facility]ola Start: 4-Oct-2013 Generic Substitution Allowed, Disp: , Rfl:     prenatal vitamin (Classic Prenatal) 28 mg iron-800 mcg folic acid tablet tablet, Take 1 tablet by mouth once daily., Disp: , Rfl:     Preparation H 0.25-14-74.9 % rectal ointment, INSERT INTO THE RECTUM TWICE DAILY AS NEEDED FOR HEMORRHOIDS FOR UP TO 7 DAYS, Disp: , Rfl:     thiamine 100 mg tablet, 1 tablet (100 mg) by Enteral route 3 times a day., Disp: , Rfl:     tissue resp fact-shark luis oil (Hemorrhoid) rectal ointment, INSERT INTO THE RECTUM TWICE DAILY AS NEEDED FOR HEMORRHOIDS, Disp: 28 g, Rfl: 0    Allergies:  Allergies   Allergen Reactions    Other Other       Review of systems:  Constitutional: negative for fevers, chills, weight loss, weight gain, change in appetite, fatigue, weakness.  HEENT: negative for headache, changes in vision or hearing, congestion, sore throat.  Respiratory: negative for SOB, cough, hemoptysis, wheezing  Cardiovascular: negative for chest pain, palpitations, orthopnea, PND  GI: negative for dysphagia, abdominal pain, nausea, vomiting, diarrhea, constipation, melena, hematochezia, BRBPR  : negative for frequency, urgency, dysuria, hematuria, incontinence  MSK: negative for myalgia, arthralgia, decreased joint ROM, LE swelling  Skin: negative for rash, wounds  Heme/lymph: negative for easy bruising, bleeding, epistaxis  Neuro: negative for LOC, numbness, tingling, tremor, vertigo, dizziness    Vitals:  There were no vitals filed for this visit.    Physical  exam:  Constitutional: Well-developed female in no acute distress.  HEENT: Normocephalic, atraumatic. PERRL. EOMI. No cervical lymphadenopathy.  Respiratory: CTA bilaterally. No wheezes, rales, or rhonchi. Normal respiratory effort.  Cardiovascular: RRR. No murmurs, gallops, or rubs. No JVD. Radial pulses 2+.  Abdominal: Soft, nondistended, nontender to palpation. Bowel sounds present. No hepatosplenomegaly or masses. No CVA tenderness.  Neuro: CN II-XII intact. UE and LE strength 5/5 bilaterally and sensation intact. Normal FTN testing.  MSK: No LE edema bilaterally.  Skin: Warm, dry. No rashes or wounds.  Psych: Appropriate mood and affect.    Labs:  No results found for this or any previous visit (from the past 24 hour(s)).    Imaging:  No results found.    Assessment and Plan:  Frankeya Edwards is a 38 y.o. female with past medical history of hypertension, bipolar disorder, and schizophrenia presenting today to [facility].  Patient receives her mental health care at [facility].     #leukopenia  - WBC 2.8 on 1/2024 CBC  - believed to be 2/2 heavy EtOH use  - will recheck next visit     #hepatic steatosis  #Class III obesity  -abdominal US 1/2024 for abdominal pain demonstrated hepatic steatosis, likely 2/2 alcohol use disorder vs KLEIN 2/2 obesity     #HTN  - BP today 151/97  - advised on weight loss, dietary, lifestyle measures that may improve BP including abstinence from alcohol  - pt would like to work on lifestyle measures before increasing antihypertensive doses  - c/w losartan 50mg daily, amlodipine 10 mg daily    #bipolar disorder   #schizophrenia   - follows at [facility]   - on aripiprazole, paroxetine, valproate     #polysubstance use disorder   - alcohol and tobacco   - pt reports drinking 4-5 fifths of alcohol per week   - interested in rehab, waiting for bed to open  - counseled smoking cessation     #Health Maintenance   Screening:  Lung Cancer: not indicated   Colon Cancer: not indicated    Breast Cancer: not indicated   Cervical Cancer: last PAP 9/2023     Labs:  Lipid Panel: tchol 245, , HDL 75, triglycerides 136  HbA1c: 4.6% 1/2024     Infectious Disease   HIV: negative 1/2024  Hep C:  negative 1/2024  STIs: negative Syphilis 9/2023     Vaccinations:  Influenza: refused  Tdap: due 12/2024    Follow-up in 3-4 months for blood pressure check.    Patient and plan discussed with attending physician, Dr. Saucedo.    Makayla Stewart MD  PGY-1 Internal Medicine  [facility]

## 2024-05-21 NOTE — PROGRESS NOTES
I saw and evaluated the patient. I personally obtained the key and critical portions of the history and physical exam or was physically present for key and critical portions performed by the resident/fellow. I reviewed the resident/fellow's documentation and discussed the patient with the resident/fellow. I agree with the resident/fellow's medical decision making as documented in the note.    Jules Saucedo MD MPH

## 2024-06-24 DIAGNOSIS — I10 HYPERTENSION, UNSPECIFIED TYPE: ICD-10-CM

## 2024-06-26 RX ORDER — AMLODIPINE BESYLATE 10 MG/1
10 TABLET ORAL DAILY
Qty: 30 TABLET | Refills: 5 | Status: SHIPPED | OUTPATIENT
Start: 2024-06-26

## 2024-07-18 ENCOUNTER — APPOINTMENT (OUTPATIENT)
Dept: OTOLARYNGOLOGY | Facility: CLINIC | Age: 39
End: 2024-07-18
Payer: COMMERCIAL

## 2024-11-28 DIAGNOSIS — I10 PRIMARY HYPERTENSION: ICD-10-CM

## 2024-12-02 RX ORDER — LOSARTAN POTASSIUM 50 MG/1
50 TABLET ORAL DAILY
Qty: 30 TABLET | Refills: 0 | Status: SHIPPED | OUTPATIENT
Start: 2024-12-02

## 2024-12-02 NOTE — PROGRESS NOTES
Received in-basket request for refill of losartan 50 mg daiy. Have refilled 12/02/24.    Sandra Sainz MD  Internal Medicine, PGY-1

## 2024-12-11 ENCOUNTER — OFFICE VISIT (OUTPATIENT)
Dept: PRIMARY CARE | Facility: HOSPITAL | Age: 39
End: 2024-12-11
Payer: COMMERCIAL

## 2024-12-11 VITALS
DIASTOLIC BLOOD PRESSURE: 137 MMHG | OXYGEN SATURATION: 93 % | SYSTOLIC BLOOD PRESSURE: 175 MMHG | HEIGHT: 65 IN | WEIGHT: 293 LBS | HEART RATE: 77 BPM | TEMPERATURE: 97.4 F | BODY MASS INDEX: 48.82 KG/M2

## 2024-12-11 DIAGNOSIS — R35.0 URINARY FREQUENCY: Primary | ICD-10-CM

## 2024-12-11 DIAGNOSIS — I10 HYPERTENSION, UNSPECIFIED TYPE: ICD-10-CM

## 2024-12-11 DIAGNOSIS — Z11.3 SCREEN FOR STD (SEXUALLY TRANSMITTED DISEASE): ICD-10-CM

## 2024-12-11 DIAGNOSIS — I10 PRIMARY HYPERTENSION: ICD-10-CM

## 2024-12-11 LAB
APPEARANCE UR: ABNORMAL
BILIRUB UR STRIP.AUTO-MCNC: NEGATIVE MG/DL
COLOR UR: ABNORMAL
GLUCOSE UR STRIP.AUTO-MCNC: NORMAL MG/DL
HBV CORE AB SER QL: NONREACTIVE
HBV SURFACE AB SER-ACNC: <3.1 MIU/ML
HBV SURFACE AG SERPL QL IA: NONREACTIVE
HCV AB SER QL: NONREACTIVE
HIV 1+2 AB+HIV1 P24 AG SERPL QL IA: NONREACTIVE
KETONES UR STRIP.AUTO-MCNC: NEGATIVE MG/DL
LEUKOCYTE ESTERASE UR QL STRIP.AUTO: NEGATIVE
NITRITE UR QL STRIP.AUTO: NEGATIVE
PH UR STRIP.AUTO: 6 [PH]
POC APPEARANCE, URINE: CLEAR
POC BILIRUBIN, URINE: ABNORMAL
POC BLOOD, URINE: NEGATIVE
POC COLOR, URINE: YELLOW
POC GLUCOSE, URINE: NEGATIVE MG/DL
POC KETONES, URINE: ABNORMAL MG/DL
POC PH, URINE: 6 PH
POC PROTEIN, URINE: ABNORMAL MG/DL
POC SPECIFIC GRAVITY, URINE: >=1.03
POC UROBILINOGEN, URINE: 0.2 EU/DL
PROT UR STRIP.AUTO-MCNC: ABNORMAL MG/DL
RBC # UR STRIP.AUTO: NEGATIVE /UL
RBC #/AREA URNS AUTO: NORMAL /HPF
SP GR UR STRIP.AUTO: 1.03
SQUAMOUS #/AREA URNS AUTO: NORMAL /HPF
TREPONEMA PALLIDUM IGG+IGM AB [PRESENCE] IN SERUM OR PLASMA BY IMMUNOASSAY: NONREACTIVE
UROBILINOGEN UR STRIP.AUTO-MCNC: NORMAL MG/DL
WBC #/AREA URNS AUTO: NORMAL /HPF

## 2024-12-11 PROCEDURE — 86704 HEP B CORE ANTIBODY TOTAL: CPT

## 2024-12-11 PROCEDURE — 3080F DIAST BP >= 90 MM HG: CPT | Performed by: INTERNAL MEDICINE

## 2024-12-11 PROCEDURE — 99215 OFFICE O/P EST HI 40 MIN: CPT | Performed by: INTERNAL MEDICINE

## 2024-12-11 PROCEDURE — 87389 HIV-1 AG W/HIV-1&-2 AB AG IA: CPT

## 2024-12-11 PROCEDURE — 86803 HEPATITIS C AB TEST: CPT

## 2024-12-11 PROCEDURE — 87340 HEPATITIS B SURFACE AG IA: CPT

## 2024-12-11 PROCEDURE — 99215 OFFICE O/P EST HI 40 MIN: CPT | Mod: GC

## 2024-12-11 PROCEDURE — 3077F SYST BP >= 140 MM HG: CPT | Performed by: INTERNAL MEDICINE

## 2024-12-11 PROCEDURE — 99000 SPECIMEN HANDLING OFFICE-LAB: CPT | Performed by: INTERNAL MEDICINE

## 2024-12-11 PROCEDURE — 81003 URINALYSIS AUTO W/O SCOPE: CPT | Mod: QW,GC

## 2024-12-11 PROCEDURE — 87491 CHLMYD TRACH DNA AMP PROBE: CPT

## 2024-12-11 PROCEDURE — 81003 URINALYSIS AUTO W/O SCOPE: CPT | Performed by: INTERNAL MEDICINE

## 2024-12-11 PROCEDURE — 36415 COLL VENOUS BLD VENIPUNCTURE: CPT

## 2024-12-11 PROCEDURE — 86780 TREPONEMA PALLIDUM: CPT

## 2024-12-11 PROCEDURE — 3008F BODY MASS INDEX DOCD: CPT | Performed by: INTERNAL MEDICINE

## 2024-12-11 PROCEDURE — 81001 URINALYSIS AUTO W/SCOPE: CPT

## 2024-12-11 PROCEDURE — 86706 HEP B SURFACE ANTIBODY: CPT

## 2024-12-11 PROCEDURE — 87205 SMEAR GRAM STAIN: CPT

## 2024-12-11 PROCEDURE — 87661 TRICHOMONAS VAGINALIS AMPLIF: CPT

## 2024-12-11 RX ORDER — AMLODIPINE BESYLATE 10 MG/1
10 TABLET ORAL DAILY
Qty: 30 TABLET | Refills: 2 | Status: SHIPPED | OUTPATIENT
Start: 2024-12-11

## 2024-12-11 RX ORDER — LOSARTAN POTASSIUM 50 MG/1
50 TABLET ORAL DAILY
Qty: 30 TABLET | Refills: 2 | Status: SHIPPED | OUTPATIENT
Start: 2024-12-11

## 2024-12-11 ASSESSMENT — ENCOUNTER SYMPTOMS
SHORTNESS OF BREATH: 0
ABDOMINAL PAIN: 0
VOMITING: 0
FEVER: 0
FREQUENCY: 1
POLYDIPSIA: 0
LOSS OF SENSATION IN FEET: 0
DEPRESSION: 1
DIARRHEA: 0
DYSURIA: 0
CHILLS: 0
OCCASIONAL FEELINGS OF UNSTEADINESS: 0

## 2024-12-11 ASSESSMENT — PATIENT HEALTH QUESTIONNAIRE - PHQ9
1. LITTLE INTEREST OR PLEASURE IN DOING THINGS: NOT AT ALL
2. FEELING DOWN, DEPRESSED OR HOPELESS: NOT AT ALL
SUM OF ALL RESPONSES TO PHQ9 QUESTIONS 1 AND 2: 0

## 2024-12-11 ASSESSMENT — PAIN SCALES - GENERAL: PAINLEVEL_OUTOF10: 0-NO PAIN

## 2024-12-11 ASSESSMENT — COLUMBIA-SUICIDE SEVERITY RATING SCALE - C-SSRS
1. IN THE PAST MONTH, HAVE YOU WISHED YOU WERE DEAD OR WISHED YOU COULD GO TO SLEEP AND NOT WAKE UP?: NO
6. HAVE YOU EVER DONE ANYTHING, STARTED TO DO ANYTHING, OR PREPARED TO DO ANYTHING TO END YOUR LIFE?: NO
2. HAVE YOU ACTUALLY HAD ANY THOUGHTS OF KILLING YOURSELF?: NO

## 2024-12-11 NOTE — PATIENT INSTRUCTIONS
Dear Ms. Goss,    As discussed today during the visit:    Concerning your urinary symptoms, we will get testing of your urine to check if there is an infection.  As requested, we will get testing for sexually transmitted diseases  For your hypertension, we ordered a refill for your medications. It is very important to take your blood pressure medications to keep your blood pressure controlled. We have ordered a nurse visit in 1 month so that we can measure your blood pressure when you come. On that day, please make sure you take your blood pressure medications prior to the visit.    We will call you if there are any abnormal lab results.    Please call [phone] to schedule your appointments.     Please come back to see us in: 3 months   ------  If you have any problems or questions, please contact the clinic at [phone] to leave a message. Our fax number is [phone]. If your issue cannot wait until the next business day, please go to urgent care or the emergency department.     I also strongly urge all of my patients to register for [facility] by going to: [email or web address]  (The  staff can also send you a text/email link to register when you check out).    No shows: It is understandable if you are unable to make it to a visit, but please cancel your appointment instead of not showing up. This helps to give other patients access to primary care and keeps wait times low.        [facility]   [phone]

## 2024-12-12 ENCOUNTER — TELEPHONE (OUTPATIENT)
Dept: PRIMARY CARE | Facility: HOSPITAL | Age: 39
End: 2024-12-12
Payer: COMMERCIAL

## 2024-12-12 DIAGNOSIS — N76.0 BACTERIAL VAGINOSIS: Primary | ICD-10-CM

## 2024-12-12 DIAGNOSIS — B96.89 BACTERIAL VAGINOSIS: Primary | ICD-10-CM

## 2024-12-12 LAB
C TRACH RRNA SPEC QL NAA+PROBE: NEGATIVE
CLUE CELLS VAG LPF-#/AREA: PRESENT /[LPF]
HOLD SPECIMEN: NORMAL
N GONORRHOEA DNA SPEC QL PROBE+SIG AMP: NEGATIVE
NUGENT SCORE: 9
T VAGINALIS RRNA SPEC QL NAA+PROBE: NEGATIVE
YEAST VAG WET PREP-#/AREA: ABNORMAL

## 2024-12-12 RX ORDER — METRONIDAZOLE 500 MG/1
500 TABLET ORAL 2 TIMES DAILY
Qty: 14 TABLET | Refills: 0 | Status: SHIPPED | OUTPATIENT
Start: 2024-12-12 | End: 2024-12-19

## 2024-12-12 NOTE — TELEPHONE ENCOUNTER
Called patient today to inform her of her tests results. Tests came back positive for bacterial vaginosis.   Patient did not answer. I prescribed Metronidazole and sent it to her pharmacy. Will try to call her again

## 2025-03-11 ENCOUNTER — OFFICE VISIT (OUTPATIENT)
Dept: PRIMARY CARE | Facility: HOSPITAL | Age: 40
End: 2025-03-11
Payer: COMMERCIAL

## 2025-03-11 VITALS
DIASTOLIC BLOOD PRESSURE: 99 MMHG | HEIGHT: 65 IN | SYSTOLIC BLOOD PRESSURE: 142 MMHG | TEMPERATURE: 95.7 F | HEART RATE: 93 BPM | BODY MASS INDEX: 48.82 KG/M2 | WEIGHT: 293 LBS | OXYGEN SATURATION: 99 %

## 2025-03-11 DIAGNOSIS — R82.998 DARK URINE: ICD-10-CM

## 2025-03-11 DIAGNOSIS — I10 PRIMARY HYPERTENSION: Primary | ICD-10-CM

## 2025-03-11 DIAGNOSIS — E66.01 OBESITY, CLASS III, BMI 40-49.9 (MORBID OBESITY) (MULTI): ICD-10-CM

## 2025-03-11 DIAGNOSIS — Z11.3 SCREENING EXAMINATION FOR STD (SEXUALLY TRANSMITTED DISEASE): ICD-10-CM
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
## 2025-03-11 PROBLEM — O99.891 OBSTETRIC DISORDER OF UTERUS (HHS-HCC): Status: RESOLVED | Noted: 2024-02-26 | Resolved: 2025-03-11

## 2025-03-11 PROBLEM — O28.3 ABNORMAL PRENATAL ULTRASOUND: Status: RESOLVED | Noted: 2024-02-26 | Resolved: 2025-03-11

## 2025-03-11 PROBLEM — Z86.79 HISTORY OF HYPERTENSION: Status: RESOLVED | Noted: 2024-02-26 | Resolved: 2025-03-11

## 2025-03-11 PROBLEM — N85.9 OBSTETRIC DISORDER OF UTERUS (HHS-HCC): Status: RESOLVED | Noted: 2024-02-26 | Resolved: 2025-03-11

## 2025-03-11 PROBLEM — T14.8XXA SURGICAL WOUND PRESENT: Status: RESOLVED | Noted: 2024-02-26 | Resolved: 2025-03-11

## 2025-03-11 PROBLEM — O99.210 OBESITY AFFECTING PREGNANCY (HHS-HCC): Status: RESOLVED | Noted: 2024-02-26 | Resolved: 2025-03-11

## 2025-03-11 PROBLEM — O10.019: Status: RESOLVED | Noted: 2024-02-26 | Resolved: 2025-03-11

## 2025-03-11 PROBLEM — O99.210 OBESITY AFFECTING PREGNANCY, ANTEPARTUM (HHS-HCC): Status: RESOLVED | Noted: 2024-02-26 | Resolved: 2025-03-11

## 2025-03-11 PROBLEM — R03.0 ELEVATED BLOOD PRESSURE READING: Status: RESOLVED | Noted: 2024-02-26 | Resolved: 2025-03-11

## 2025-03-11 PROBLEM — O10.919 CHRONIC HYPERTENSION IN PREGNANCY (HHS-HCC): Status: RESOLVED | Noted: 2024-02-26 | Resolved: 2025-03-11

## 2025-03-11 PROBLEM — G89.18 POSTOPERATIVE PAIN: Status: RESOLVED | Noted: 2024-02-26 | Resolved: 2025-03-11

## 2025-03-11 PROBLEM — Z98.51 TUBAL LIGATION STATUS: Status: ACTIVE | Noted: 2025-03-11
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
## 2025-03-11 PROBLEM — T14.90XA TRAUMA: Status: RESOLVED | Noted: 2024-02-26 | Resolved: 2025-03-11

## 2025-03-11 PROBLEM — G89.18 POST-OP PAIN: Status: RESOLVED | Noted: 2024-02-26 | Resolved: 2025-03-11

## 2025-03-11 PROBLEM — R73.09 ABNORMAL GLUCOSE LEVEL: Status: RESOLVED | Noted: 2024-02-26 | Resolved: 2025-03-11

## 2025-03-11 PROBLEM — R30.0 DYSURIA: Status: RESOLVED | Noted: 2024-02-26 | Resolved: 2025-03-11

## 2025-03-11 PROBLEM — B49 INFECTION DUE TO FUNGUS: Status: RESOLVED | Noted: 2024-02-26 | Resolved: 2025-03-11

## 2025-03-11 PROBLEM — O34.219 HISTORY OF CESAREAN DELIVERY, CURRENTLY PREGNANT (HHS-HCC): Status: RESOLVED | Noted: 2024-02-26 | Resolved: 2025-03-11

## 2025-03-11 PROBLEM — S39.012A LUMBOSACRAL STRAIN: Status: RESOLVED | Noted: 2023-08-31 | Resolved: 2025-03-11

## 2025-03-11 PROBLEM — Z34.80: Status: RESOLVED | Noted: 2024-02-26 | Resolved: 2025-03-11

## 2025-03-11 PROBLEM — O35.EXX0 PYELECTASIS OF FETUS ON PRENATAL ULTRASOUND (HHS-HCC): Status: RESOLVED | Noted: 2024-02-26 | Resolved: 2025-03-11

## 2025-03-11 PROBLEM — O35.BXX0 ABNORMAL FETAL ECHOCARDIOGRAM AFFECTING ANTEPARTUM CARE OF MOTHER (HHS-HCC): Status: RESOLVED | Noted: 2024-02-26 | Resolved: 2025-03-11

## 2025-03-11 PROBLEM — Z86.39 HISTORY OF OBESITY: Status: RESOLVED | Noted: 2024-02-26 | Resolved: 2025-03-11

## 2025-03-11 PROBLEM — N61.1 ABSCESS OF BREAST: Status: RESOLVED | Noted: 2024-02-26 | Resolved: 2025-03-11

## 2025-03-11 PROCEDURE — 99215 OFFICE O/P EST HI 40 MIN: CPT

## 2025-03-11 PROCEDURE — 3080F DIAST BP >= 90 MM HG: CPT

## 2025-03-11 PROCEDURE — 3008F BODY MASS INDEX DOCD: CPT

## 2025-03-11 PROCEDURE — 3077F SYST BP >= 140 MM HG: CPT

## 2025-03-11 PROCEDURE — 99215 OFFICE O/P EST HI 40 MIN: CPT | Mod: GC

## 2025-03-11 RX ORDER — DIVALPROEX SODIUM 250 MG/1
250 TABLET, DELAYED RELEASE ORAL 2 TIMES DAILY
COMMUNITY
Start: 2025-02-13 | End: 2025-03-11 | Stop reason: WASHOUT

## 2025-03-11 RX ORDER — LOSARTAN POTASSIUM 50 MG/1
50 TABLET ORAL DAILY
Qty: 30 TABLET | Refills: 11 | Status: SHIPPED | OUTPATIENT
Start: 2025-03-11

## 2025-03-11 ASSESSMENT — PATIENT HEALTH QUESTIONNAIRE - PHQ9
SUM OF ALL RESPONSES TO PHQ9 QUESTIONS 1 AND 2: 0
2. FEELING DOWN, DEPRESSED OR HOPELESS: NOT AT ALL
1. LITTLE INTEREST OR PLEASURE IN DOING THINGS: NOT AT ALL

## 2025-03-11 ASSESSMENT — ENCOUNTER SYMPTOMS
DEPRESSION: 1
OCCASIONAL FEELINGS OF UNSTEADINESS: 0
LOSS OF SENSATION IN FEET: 0

## 2025-03-11 ASSESSMENT — PAIN SCALES - GENERAL: PAINLEVEL_OUTOF10: 0-NO PAIN

## 2025-03-11 NOTE — PROGRESS NOTES
I saw and evaluated the patient. I personally obtained the key and critical portions of the history and physical exam or was physically present for key and critical portions performed by the resident/fellow. I reviewed the resident/fellow's documentation and discussed the patient with the resident/fellow. I agree with the resident/fellow's medical decision making as documented in the note.    Hawa Levy MD

## 2025-03-11 NOTE — PROGRESS NOTES
[facility]    HPI:  Frankeya Edwards is a 38 y.o. female with past medical history of hypertension, tobacco and alcohol use, bipolar disorder, and schizophrenia presenting today for follow up. She was last seen in [facility] by Dr. Cruz on 12/11/2024 for urinary urgency.    Drinks EtOH on the weekends, about a fifth per weekend. Had some withdrawal symptoms around 2 mo ago when she went from drinking daily to drinking only on the weekends with some cravings associated with it. Denied any seizure or hallucinations. Still smoking Black and Milds daily.    Blood pressure in office today is 142/99, slightly improved from last visit. She ran out of losartan so has only been taking amlodipine.    No changes to her diet. Is happy with her current weight and has lost around 15lbs since December. States she has been doing squats for exercise.    Mental health has been good and still follows with psych in the community. Remains on Abilify and paroxetine, discontinued valproate d/t headaches.    Only complaint today is some dark urine. Denies dysuria, frequency, urgency, vaginal discharge. States she drinks plenty of water and has good UOP otherwise. Requesting test for UTI.    Medications:    Current Outpatient Medications:     divalproex (Depakote) 250 mg EC tablet, Take 1 tablet (250 mg) by mouth 2 times a day., Disp: , Rfl:     amLODIPine (Norvasc) 10 mg tablet, Take 1 tablet (10 mg) by mouth once daily., Disp: 30 tablet, Rfl: 2    ARIPiprazole (Abilify) 15 mg tablet, Take 1 tablet (15 mg) by mouth once daily in the morning. Take before meals., Disp: , Rfl:     ARIPiprazole (Abilify) 2 mg tablet, TAKE ONE TABLET ONCE DAY, Disp: , Rfl:     ARIPiprazole (Abilify) 5 mg tablet, Take 1 tablet (5 mg) by mouth once daily., Disp: , Rfl:     docusate sodium (Colace) 100 mg capsule, Take by mouth 2 times a day as needed., Disp: , Rfl:     ibuprofen 600 mg tablet, Take 1 tablet (600 mg) by mouth every 8 hours if  needed., Disp: , Rfl:     losartan (Cozaar) 50 mg tablet, Take 1 tablet (50 mg) by mouth once daily., Disp: 30 tablet, Rfl: 2    miscellaneous medical supply (Blood Pressure Cuff) misc, 1 Units once daily., Disp: 1 each, Rfl: 0    multivitamin with minerals (multivitamin-iron-folic acid) tablet, Take 1 tablet by mouth once daily with breakfast., Disp: , Rfl:     PARoxetine (Paxil) 30 mg tablet, Take 1 tablet (30 mg) by mouth once daily., Disp: , Rfl:     witch hazel-glycerin (Hemorrhoidal Medicated) pad, Apply topically if needed for irritation or hemorrhoids., Disp: 100 each, Rfl: 11    Allergies:  Allergies   Allergen Reactions    Other Other       Review of systems:  Constitutional: negative for fevers, chills, weight loss, weight gain, change in appetite, fatigue, weakness.  HEENT: negative for headache, changes in vision or hearing, congestion, sore throat.  Respiratory: negative for SOB, cough, hemoptysis, wheezing  Cardiovascular: negative for chest pain, palpitations, orthopnea, PND  GI: negative for dysphagia, abdominal pain, nausea, vomiting, diarrhea, constipation, melena, hematochezia, BRBPR  : negative for frequency, urgency, dysuria, hematuria, incontinence  MSK: negative for myalgia, arthralgia, decreased joint ROM, LE swelling  Skin: negative for rash, wounds  Heme/lymph: negative for easy bruising, bleeding, epistaxis  Neuro: negative for LOC, numbness, tingling, tremor, vertigo, dizziness    Vitals:  There were no vitals filed for this visit.    Physical exam:  Constitutional: Well-developed female in no acute distress.  HEENT: Normocephalic, atraumatic. PERRL. EOMI. No cervical lymphadenopathy.  Respiratory: CTA bilaterally. No wheezes, rales, or rhonchi. Normal respiratory effort.  Cardiovascular: RRR. No murmurs, gallops, or rubs. No JVD. Radial pulses 2+.  Abdominal: Soft, nondistended, nontender to palpation. Bowel sounds present. No hepatosplenomegaly or masses. No CVA tenderness.  Neuro:  CN II-XII intact. UE and LE strength 5/5 bilaterally and sensation intact. Normal FTN testing.  MSK: No LE edema bilaterally.  Skin: Warm, dry. No rashes or wounds.  Psych: Appropriate mood and affect.    Labs:  No results found for this or any previous visit (from the past 24 hours).    Imaging:  No results found.    Assessment and Plan:  Frankeya Edwards is a 38 y.o. female with past medical history of hypertension, bipolar disorder, schizophrenia, and polysubstance use disorder presenting for follow up. Remains hypertensive though is not on both prescribed agents, will have her restart losartan. Will obtain routine labs, UA, and STI screening    #HTN  - BP today 142/99  - advised on weight loss, dietary, lifestyle measures that may improve BP including abstinence from alcohol  - c/w amlodipine 10 mg daily, restart losartan 50 mg daily    #Encounter for STI screening  #Dark urine  - patient requests STI screening and UA for dark colored urine  - ordered UA, HIV, syphilis, CT/NG testing  - declines pelvic exam for vaginal swab  - counseled on condom usage to prevent STI  - patient is s/p tubal ligation for contraception     #leukopenia  - WBC 2.8 on 1/2024 CBC  - believed to be 2/2 heavy EtOH use  - will recheck today     #hepatic steatosis  #Class III obesity  -abdominal US 1/2024 for abdominal pain demonstrated hepatic steatosis, likely 2/2 alcohol use disorder vs KLEIN 2/2 obesity  - ordered lipid panel today  - patient generally not very interested in weight loss, states she is happy with her weight and body image    #bipolar disorder   #schizophrenia   - follows [facility]   - on aripiprazole, paroxetine  - discontinued valproate d/t headaches     #polysubstance use disorder   - alcohol and tobacco   - pt reports drinking one fifths of alcohol per week, improved from multiple fifths per day a few months ago  - not interested in naltrexone at this time  - precontemplative phase of smoking cessation      #Health Maintenance   Screening:  Lung Cancer: not indicated   Colon Cancer: not indicated   Breast Cancer: not indicated   Cervical Cancer: last PAP 9/2023     Labs:  Lipid Panel: tchol 245, , HDL 75, triglycerides 136  HbA1c: 4.6% 1/2024     Infectious Disease   HIV: negative 1/2024, recheck today  Hep C:  negative 1/2024  STIs: negative Syphilis 9/2023     Vaccinations:  Influenza: refused  Tdap: due 12/2024, declines today  Pneumococcal: qualifies d/t smoking and EtOH use, declines    RTC in 3 mo for BP check    Patient and plan discussed with attending physician, Dr. Levy.    Makayla Stewart MD  Internal Medicine PGY-2  [facility]

## 2025-03-11 NOTE — PATIENT INSTRUCTIONS
It was a pleasure to see you in clinic today! We discussed the following topics:  Blood pressure: please start taking losartan 50 mg daily along with amlodipine 10 mg daily.  Labs: we will call you with results of any abnormal tests.    Please follow-up with an office visit with us in 3 months.     is now using [employer], an online health record portal with your visit notes, results, and the ability to portal message your physicians. Ask about getting access at the check-out desk!    To schedule a specialty appointment or test, please call [phone].    If you have any questions or concerns, please contact the [facility] at [phone].    Sincerely,    Makayla Stewart MD  Internal Medicine PGY-2  [facility]  Phone: [phone]  Fax: [phone]

## 2025-03-12 ENCOUNTER — DOCUMENTATION (OUTPATIENT)
Dept: INTERNAL MEDICINE | Facility: HOSPITAL | Age: 40
End: 2025-03-12
Payer: COMMERCIAL

## 2025-03-12 DIAGNOSIS — N30.01 ACUTE CYSTITIS WITH HEMATURIA: Primary | ICD-10-CM

## 2025-03-12 RX ORDER — NITROFURANTOIN 25; 75 MG/1; MG/1
100 CAPSULE ORAL 2 TIMES DAILY
Qty: 10 CAPSULE | Refills: 0 | Status: SHIPPED | OUTPATIENT
Start: 2025-03-12 | End: 2025-03-17

## 2025-03-12 NOTE — PROGRESS NOTES
Following up on labs from prior day. Patient had abnormal UA suggestive of UTI. Dr Stewart had already placed order bactrim. Agree. Reminded patient to  meds.

## 2025-03-12 NOTE — PROGRESS NOTES
Noted UA from office visit on 3/11 with pyuria, hematuria, positive nitrite, and bacteriuria. Will treat patient for uncomplicated cystitis. Sent course of antibiotics to patient preferred pharmacy and notified patient, who was in agreement with plan. Discussed with attending Dr. Levy.    Makayla Stewart MD  IM PGY-2

## 2025-03-15 LAB
ALBUMIN SERPL-MCNC: 4.4 G/DL (ref 3.6–5.1)
ALP SERPL-CCNC: 64 U/L (ref 31–125)
ALT SERPL-CCNC: 10 U/L (ref 6–29)
ANION GAP SERPL CALCULATED.4IONS-SCNC: 9 MMOL/L (CALC) (ref 7–17)
APPEARANCE UR: ABNORMAL
AST SERPL-CCNC: 18 U/L (ref 10–30)
BACTERIA #/AREA URNS HPF: ABNORMAL /HPF
BACTERIA UR CULT: ABNORMAL
BASOPHILS # BLD AUTO: 29 CELLS/UL (ref 0–200)
BASOPHILS NFR BLD AUTO: 0.8 %
BILIRUB SERPL-MCNC: 0.7 MG/DL (ref 0.2–1.2)
BILIRUB UR QL STRIP: NEGATIVE
BUN SERPL-MCNC: 13 MG/DL (ref 7–25)
C TRACH RRNA SPEC QL NAA+PROBE: NOT DETECTED
CALCIUM SERPL-MCNC: 9.2 MG/DL (ref 8.6–10.2)
CHLORIDE SERPL-SCNC: 102 MMOL/L (ref 98–110)
CHOLEST SERPL-MCNC: 181 MG/DL
CHOLEST/HDLC SERPL: 3 (CALC)
CO2 SERPL-SCNC: 24 MMOL/L (ref 20–32)
COLOR UR: ABNORMAL
CREAT SERPL-MCNC: 0.8 MG/DL (ref 0.5–0.97)
EGFRCR SERPLBLD CKD-EPI 2021: 97 ML/MIN/1.73M2
EOSINOPHIL # BLD AUTO: 29 CELLS/UL (ref 15–500)
EOSINOPHIL NFR BLD AUTO: 0.8 %
ERYTHROCYTE [DISTWIDTH] IN BLOOD BY AUTOMATED COUNT: 12.2 % (ref 11–15)
GLUCOSE SERPL-MCNC: 107 MG/DL (ref 65–99)
GLUCOSE UR QL STRIP: NEGATIVE
HCT VFR BLD AUTO: 43.8 % (ref 35–45)
HDLC SERPL-MCNC: 60 MG/DL
HGB BLD-MCNC: 14.6 G/DL (ref 11.7–15.5)
HGB UR QL STRIP: ABNORMAL
HIV 1+2 AB+HIV1 P24 AG SERPL QL IA: NORMAL
HYALINE CASTS #/AREA URNS LPF: ABNORMAL /LPF
KETONES UR QL STRIP: ABNORMAL
LDLC SERPL CALC-MCNC: 97 MG/DL (CALC)
LEUKOCYTE ESTERASE UR QL STRIP: ABNORMAL
LYMPHOCYTES # BLD AUTO: 1771 CELLS/UL (ref 850–3900)
LYMPHOCYTES NFR BLD AUTO: 49.2 %
MCH RBC QN AUTO: 32.4 PG (ref 27–33)
MCHC RBC AUTO-ENTMCNC: 33.3 G/DL (ref 32–36)
MCV RBC AUTO: 97.3 FL (ref 80–100)
MONOCYTES # BLD AUTO: 349 CELLS/UL (ref 200–950)
MONOCYTES NFR BLD AUTO: 9.7 %
N GONORRHOEA RRNA SPEC QL NAA+PROBE: NOT DETECTED
NEUTROPHILS # BLD AUTO: 1422 CELLS/UL (ref 1500–7800)
NEUTROPHILS NFR BLD AUTO: 39.5 %
NITRITE UR QL STRIP: POSITIVE
NONHDLC SERPL-MCNC: 121 MG/DL (CALC)
PH UR STRIP: 5.5 [PH] (ref 5–8)
PLATELET # BLD AUTO: 184 THOUSAND/UL (ref 140–400)
PMV BLD REES-ECKER: 11.6 FL (ref 7.5–12.5)
POTASSIUM SERPL-SCNC: 3.4 MMOL/L (ref 3.5–5.3)
PROT SERPL-MCNC: 7.8 G/DL (ref 6.1–8.1)
PROT UR QL STRIP: ABNORMAL
QUEST GC CT AMPLIFIED (ALWAYS MESSAGE): NORMAL
RBC # BLD AUTO: 4.5 MILLION/UL (ref 3.8–5.1)
RBC #/AREA URNS HPF: ABNORMAL /HPF
SERVICE CMNT-IMP: ABNORMAL
SODIUM SERPL-SCNC: 135 MMOL/L (ref 135–146)
SP GR UR STRIP: 1.03 (ref 1–1.03)
SQUAMOUS #/AREA URNS HPF: ABNORMAL /HPF
T PALLIDUM AB SER QL IA: NEGATIVE
TRIGL SERPL-MCNC: 144 MG/DL
WBC # BLD AUTO: 3.6 THOUSAND/UL (ref 3.8–10.8)
WBC #/AREA URNS HPF: ABNORMAL /HPF

## 2025-03-31 ENCOUNTER — APPOINTMENT (OUTPATIENT)
Dept: OBSTETRICS AND GYNECOLOGY | Facility: CLINIC | Age: 40
End: 2025-03-31
Payer: COMMERCIAL